# Patient Record
Sex: FEMALE | URBAN - METROPOLITAN AREA
[De-identification: names, ages, dates, MRNs, and addresses within clinical notes are randomized per-mention and may not be internally consistent; named-entity substitution may affect disease eponyms.]

---

## 2023-05-15 ENCOUNTER — APPOINTMENT (RX ONLY)
Dept: URBAN - METROPOLITAN AREA CLINIC 110 | Facility: CLINIC | Age: 28
Setting detail: DERMATOLOGY
End: 2023-05-15

## 2023-05-15 DIAGNOSIS — L71.0 PERIORAL DERMATITIS: ICD-10-CM | Status: INADEQUATELY CONTROLLED

## 2023-05-15 PROCEDURE — ? PRESCRIPTION MEDICATION MANAGEMENT

## 2023-05-15 PROCEDURE — 99203 OFFICE O/P NEW LOW 30 MIN: CPT

## 2023-05-15 PROCEDURE — ? PRESCRIPTION

## 2023-05-15 PROCEDURE — ? COUNSELING

## 2023-05-15 RX ORDER — PIMECROLIMUS 10 MG/G
CREAM TOPICAL BID
Qty: 60 | Refills: 2 | Status: ERX | COMMUNITY
Start: 2023-05-15

## 2023-05-15 RX ADMIN — PIMECROLIMUS: 10 CREAM TOPICAL at 00:00

## 2023-05-15 ASSESSMENT — LOCATION ZONE DERM: LOCATION ZONE: LIP

## 2023-05-15 ASSESSMENT — LOCATION DETAILED DESCRIPTION DERM: LOCATION DETAILED: LEFT UPPER CUTANEOUS LIP

## 2023-05-15 ASSESSMENT — LOCATION SIMPLE DESCRIPTION DERM: LOCATION SIMPLE: LEFT LIP

## 2023-06-22 ENCOUNTER — APPOINTMENT (RX ONLY)
Dept: URBAN - METROPOLITAN AREA CLINIC 110 | Facility: CLINIC | Age: 28
Setting detail: DERMATOLOGY
End: 2023-06-22

## 2023-06-22 DIAGNOSIS — L71.0 PERIORAL DERMATITIS: ICD-10-CM

## 2023-06-22 PROCEDURE — ? COUNSELING

## 2023-06-22 PROCEDURE — ? PRESCRIPTION MEDICATION MANAGEMENT

## 2023-06-22 PROCEDURE — ? DIAGNOSIS COMMENT

## 2023-06-22 PROCEDURE — ? PRESCRIPTION

## 2023-06-22 PROCEDURE — 99213 OFFICE O/P EST LOW 20 MIN: CPT

## 2023-06-22 RX ORDER — AZELAIC ACID 0.15 G/G
1 AEROSOL, FOAM TOPICAL BID
Qty: 50 | Refills: 3 | Status: ERX | COMMUNITY
Start: 2023-06-22

## 2023-06-22 RX ADMIN — AZELAIC ACID 1: 0.15 AEROSOL, FOAM TOPICAL at 00:00

## 2023-06-22 ASSESSMENT — LOCATION DETAILED DESCRIPTION DERM: LOCATION DETAILED: LEFT UPPER CUTANEOUS LIP

## 2023-06-22 ASSESSMENT — LOCATION ZONE DERM: LOCATION ZONE: LIP

## 2023-06-22 ASSESSMENT — LOCATION SIMPLE DESCRIPTION DERM: LOCATION SIMPLE: LEFT LIP

## 2023-06-22 NOTE — PROCEDURE: PRESCRIPTION MEDICATION MANAGEMENT
Render In Strict Bullet Format?: No
Initiate Treatment: Finacea 15 % topical foam: Apply twice daily to affected areas on face\\n-ok to taper down to QD if well controlled, then discontinue if cleared altogether
Detail Level: Zone
Plan: Discussed doxycycline in detail, pt is not good at taking pills, defers today.
Continue Regimen: Elidel 1% cream, apply to affected areas BID\\n-ok to taper down to QD if well controlled, then discontinue if cleared altogether

## 2023-06-22 NOTE — PROCEDURE: DIAGNOSIS COMMENT
Detail Level: Simple
Render Risk Assessment In Note?: no
Comment: Recent flare after eating sushi, but seems to be responding to Elidel. Reports it took about 3 weeks for her to start seeing improvement, then had the flare with sushi and now things seem to be settling down. Still has an apparent mild patch on left nasolabial region. Discussed adding Finacea vs Doxy and discussed side effect of both. Reports she isn’t great about taking pills, so would prefer the other topical, knowing it may take a little longer to see results.

## 2023-08-03 ENCOUNTER — APPOINTMENT (RX ONLY)
Dept: URBAN - METROPOLITAN AREA CLINIC 110 | Facility: CLINIC | Age: 28
Setting detail: DERMATOLOGY
End: 2023-08-03

## 2023-08-03 DIAGNOSIS — L71.0 PERIORAL DERMATITIS: ICD-10-CM

## 2023-08-03 PROCEDURE — ? COUNSELING

## 2023-08-03 PROCEDURE — ? PRESCRIPTION

## 2023-08-03 PROCEDURE — ? DIAGNOSIS COMMENT

## 2023-08-03 PROCEDURE — ? PRESCRIPTION MEDICATION MANAGEMENT

## 2023-08-03 PROCEDURE — 99213 OFFICE O/P EST LOW 20 MIN: CPT

## 2023-08-03 RX ORDER — DOXYCYCLINE HYCLATE 60 MG/1
TABLET, DELAYED RELEASE ORAL
Qty: 120 | Refills: 2 | Status: ERX | COMMUNITY
Start: 2023-08-03

## 2023-08-03 RX ADMIN — DOXYCYCLINE HYCLATE: 60 TABLET, DELAYED RELEASE ORAL at 00:00

## 2023-08-03 ASSESSMENT — LOCATION SIMPLE DESCRIPTION DERM: LOCATION SIMPLE: LEFT LIP

## 2023-08-03 ASSESSMENT — LOCATION ZONE DERM: LOCATION ZONE: LIP

## 2023-08-03 ASSESSMENT — LOCATION DETAILED DESCRIPTION DERM: LOCATION DETAILED: LEFT UPPER CUTANEOUS LIP

## 2023-08-03 NOTE — PROCEDURE: DIAGNOSIS COMMENT
Detail Level: Simple
Render Risk Assessment In Note?: no
Comment: Reports improvement with topicals but continue to get new breakouts. \\n\\nPrevious note 6/22/23:\\nRecent flare after eating sushi, but seems to be responding to Elidel. Reports it took about 3 weeks for her to start seeing improvement, then had the flare with sushi and now things seem to be settling down. Still has an apparent mild patch on left nasolabial region. Discussed adding Finacea vs Doxy and discussed side effect of both. Reports she isn’t great about taking pills, so would prefer the other topical, knowing it may take a little longer to see results.

## 2023-08-03 NOTE — PROCEDURE: PRESCRIPTION MEDICATION MANAGEMENT
Render In Strict Bullet Format?: No
Initiate Treatment: Doryx MPC 60 mg tablet up to 4 pills by mouth daily.
Detail Level: Zone
Continue Regimen: Elidel 1% cream, apply to affected areas BID\\n-ok to taper down to QD if well controlled, then discontinue if cleared altogether\\n\\nFinacea 15 % foam: Apply twice daily to affected areas on face\\n-ok to taper down to QD if well controlled, then discontinue if cleared altogether